# Patient Record
Sex: MALE | Race: WHITE | URBAN - METROPOLITAN AREA
[De-identification: names, ages, dates, MRNs, and addresses within clinical notes are randomized per-mention and may not be internally consistent; named-entity substitution may affect disease eponyms.]

---

## 2018-10-26 ENCOUNTER — OFFICE VISIT (OUTPATIENT)
Dept: FAMILY MEDICINE CLINIC | Facility: CLINIC | Age: 38
End: 2018-10-26
Payer: COMMERCIAL

## 2018-10-26 VITALS
BODY MASS INDEX: 29 KG/M2 | WEIGHT: 196 LBS | DIASTOLIC BLOOD PRESSURE: 78 MMHG | SYSTOLIC BLOOD PRESSURE: 119 MMHG | HEART RATE: 65 BPM | TEMPERATURE: 98 F

## 2018-10-26 DIAGNOSIS — M75.21 BICEPS TENDONITIS ON RIGHT: Primary | ICD-10-CM

## 2018-10-26 PROCEDURE — 99213 OFFICE O/P EST LOW 20 MIN: CPT | Performed by: FAMILY MEDICINE

## 2018-10-26 PROCEDURE — 99212 OFFICE O/P EST SF 10 MIN: CPT | Performed by: FAMILY MEDICINE

## 2018-10-26 RX ORDER — DICLOFENAC SODIUM 75 MG/1
75 TABLET, DELAYED RELEASE ORAL 2 TIMES DAILY
Qty: 60 TABLET | Refills: 0 | Status: SHIPPED | OUTPATIENT
Start: 2018-10-26 | End: 2020-08-03 | Stop reason: ALTCHOICE

## 2018-10-26 NOTE — PROGRESS NOTES
HPI:    Patient ID: Sherine Zamora is a 43year old male. HPI  Patient presents with:  Shoulder Pain: RT shoulder pain, dull deep pain, sore in mornings, for 2 months,   Imm/Inj: for flu shot     Review of Systems   Constitutional: Negative.     Musculo

## 2020-04-20 ENCOUNTER — MED REC SCAN ONLY (OUTPATIENT)
Dept: FAMILY MEDICINE CLINIC | Facility: CLINIC | Age: 40
End: 2020-04-20

## 2020-07-24 ENCOUNTER — MED REC SCAN ONLY (OUTPATIENT)
Dept: FAMILY MEDICINE CLINIC | Facility: CLINIC | Age: 40
End: 2020-07-24

## 2020-08-03 ENCOUNTER — OFFICE VISIT (OUTPATIENT)
Dept: FAMILY MEDICINE CLINIC | Facility: CLINIC | Age: 40
End: 2020-08-03
Payer: COMMERCIAL

## 2020-08-03 VITALS
SYSTOLIC BLOOD PRESSURE: 116 MMHG | TEMPERATURE: 97 F | HEIGHT: 69 IN | HEART RATE: 75 BPM | WEIGHT: 194 LBS | BODY MASS INDEX: 28.73 KG/M2 | DIASTOLIC BLOOD PRESSURE: 74 MMHG

## 2020-08-03 DIAGNOSIS — H69.93 DISORDER OF BOTH EUSTACHIAN TUBES: ICD-10-CM

## 2020-08-03 DIAGNOSIS — J02.9 ACUTE PHARYNGITIS, UNSPECIFIED ETIOLOGY: Primary | ICD-10-CM

## 2020-08-03 PROCEDURE — 99212 OFFICE O/P EST SF 10 MIN: CPT | Performed by: FAMILY MEDICINE

## 2020-08-03 PROCEDURE — 3074F SYST BP LT 130 MM HG: CPT | Performed by: FAMILY MEDICINE

## 2020-08-03 PROCEDURE — 3078F DIAST BP <80 MM HG: CPT | Performed by: FAMILY MEDICINE

## 2020-08-03 PROCEDURE — 3008F BODY MASS INDEX DOCD: CPT | Performed by: FAMILY MEDICINE

## 2020-08-03 PROCEDURE — 99213 OFFICE O/P EST LOW 20 MIN: CPT | Performed by: FAMILY MEDICINE

## 2020-08-03 RX ORDER — CLOBETASOL PROPIONATE 0.5 MG/G
CREAM TOPICAL
COMMUNITY
End: 2020-12-15

## 2020-08-03 RX ORDER — CLINDAMYCIN PHOSPHATE 11.9 MG/ML
SOLUTION TOPICAL
COMMUNITY
Start: 2020-03-12 | End: 2020-12-15 | Stop reason: ALTCHOICE

## 2020-08-03 RX ORDER — DOXYCYCLINE HYCLATE 100 MG/1
CAPSULE ORAL
COMMUNITY
Start: 2020-03-18 | End: 2020-12-15

## 2020-08-03 NOTE — PROGRESS NOTES
HPI:    Patient ID: Felipe Essex is a 38year old male.     HPI  Patient presents with:  Lymph Node: pain on both sides of lymph nodes   Ear Pain: both ears in pain causing slight headaches   Jaw Pain: having jaw pain when touching with tongue on inside

## 2020-11-06 ENCOUNTER — TELEMEDICINE (OUTPATIENT)
Dept: FAMILY MEDICINE CLINIC | Facility: CLINIC | Age: 40
End: 2020-11-06
Payer: COMMERCIAL

## 2020-11-06 DIAGNOSIS — Z20.822 CLOSE EXPOSURE TO COVID-19 VIRUS: Primary | ICD-10-CM

## 2020-11-06 PROCEDURE — 99441 PHONE E/M BY PHYS 5-10 MIN: CPT | Performed by: FAMILY MEDICINE

## 2020-11-06 NOTE — PROGRESS NOTES
Virtual Telephone Check-In    Mairtza Law verbally consents to a Virtual/Telephone Check-In visit on 11/06/20. Patient has been referred to the [facility] website at [email or web address] to review the yearly Consent to Treat document.     Patient underst

## 2020-11-09 ENCOUNTER — APPOINTMENT (OUTPATIENT)
Dept: LAB | Facility: HOSPITAL | Age: 40
End: 2020-11-09
Attending: FAMILY MEDICINE
Payer: COMMERCIAL

## 2020-11-09 DIAGNOSIS — Z20.822 CLOSE EXPOSURE TO COVID-19 VIRUS: ICD-10-CM

## 2020-12-15 ENCOUNTER — OFFICE VISIT (OUTPATIENT)
Dept: FAMILY MEDICINE CLINIC | Facility: CLINIC | Age: 40
End: 2020-12-15
Payer: COMMERCIAL

## 2020-12-15 VITALS
HEIGHT: 69 IN | DIASTOLIC BLOOD PRESSURE: 79 MMHG | WEIGHT: 196 LBS | SYSTOLIC BLOOD PRESSURE: 139 MMHG | HEART RATE: 71 BPM | BODY MASS INDEX: 29.03 KG/M2

## 2020-12-15 DIAGNOSIS — R42 VERTIGO: Primary | ICD-10-CM

## 2020-12-15 PROCEDURE — 3078F DIAST BP <80 MM HG: CPT | Performed by: FAMILY MEDICINE

## 2020-12-15 PROCEDURE — 3075F SYST BP GE 130 - 139MM HG: CPT | Performed by: FAMILY MEDICINE

## 2020-12-15 PROCEDURE — 99212 OFFICE O/P EST SF 10 MIN: CPT | Performed by: FAMILY MEDICINE

## 2020-12-15 PROCEDURE — 99213 OFFICE O/P EST LOW 20 MIN: CPT | Performed by: FAMILY MEDICINE

## 2020-12-15 PROCEDURE — 3008F BODY MASS INDEX DOCD: CPT | Performed by: FAMILY MEDICINE

## 2020-12-15 RX ORDER — MECLIZINE HYDROCHLORIDE 25 MG/1
25 TABLET ORAL 3 TIMES DAILY PRN
Qty: 20 TABLET | Refills: 0 | Status: SHIPPED | OUTPATIENT
Start: 2020-12-15

## 2020-12-16 NOTE — PROGRESS NOTES
HPI:    Patient ID: Becca Solorio is a 38year old male.     HPI  Patient presents with:  Dizziness: experiencing dizziness when laying down or walking, feeling unbalance, slight headache lingering when dizziness occurs, happens more at night,   for  10 d

## 2021-09-21 ENCOUNTER — MED REC SCAN ONLY (OUTPATIENT)
Dept: FAMILY MEDICINE CLINIC | Facility: CLINIC | Age: 41
End: 2021-09-21

## 2022-03-21 ENCOUNTER — OFFICE VISIT (OUTPATIENT)
Dept: FAMILY MEDICINE CLINIC | Facility: CLINIC | Age: 42
End: 2022-03-21
Payer: COMMERCIAL

## 2022-03-21 ENCOUNTER — LAB ENCOUNTER (OUTPATIENT)
Dept: LAB | Age: 42
End: 2022-03-21
Attending: FAMILY MEDICINE
Payer: COMMERCIAL

## 2022-03-21 VITALS
DIASTOLIC BLOOD PRESSURE: 71 MMHG | BODY MASS INDEX: 26.66 KG/M2 | SYSTOLIC BLOOD PRESSURE: 116 MMHG | WEIGHT: 180 LBS | HEART RATE: 65 BPM | HEIGHT: 69 IN

## 2022-03-21 DIAGNOSIS — Z00.00 ROUTINE GENERAL MEDICAL EXAMINATION AT A HEALTH CARE FACILITY: Primary | ICD-10-CM

## 2022-03-21 DIAGNOSIS — Z00.00 ROUTINE GENERAL MEDICAL EXAMINATION AT A HEALTH CARE FACILITY: ICD-10-CM

## 2022-03-21 LAB
ALBUMIN SERPL-MCNC: 4.7 G/DL (ref 3.4–5)
ALBUMIN/GLOB SERPL: 1.9 {RATIO} (ref 1–2)
ALP LIVER SERPL-CCNC: 57 U/L
ALT SERPL-CCNC: 33 U/L
ANION GAP SERPL CALC-SCNC: 5 MMOL/L (ref 0–18)
AST SERPL-CCNC: 22 U/L (ref 15–37)
BASOPHILS # BLD AUTO: 0.06 X10(3) UL (ref 0–0.2)
BASOPHILS NFR BLD AUTO: 0.9 %
BILIRUB SERPL-MCNC: 1.3 MG/DL (ref 0.1–2)
BUN BLD-MCNC: 14 MG/DL (ref 7–18)
BUN/CREAT SERPL: 13.5 (ref 10–20)
CALCIUM BLD-MCNC: 9.3 MG/DL (ref 8.5–10.1)
CHLORIDE SERPL-SCNC: 105 MMOL/L (ref 98–112)
CHOLEST SERPL-MCNC: 220 MG/DL (ref ?–200)
CO2 SERPL-SCNC: 29 MMOL/L (ref 21–32)
COMPLEXED PSA SERPL-MCNC: 1.13 NG/ML (ref ?–4)
CREAT BLD-MCNC: 1.04 MG/DL
DEPRECATED RDW RBC AUTO: 41.7 FL (ref 35.1–46.3)
EOSINOPHIL # BLD AUTO: 0.26 X10(3) UL (ref 0–0.7)
EOSINOPHIL NFR BLD AUTO: 3.7 %
ERYTHROCYTE [DISTWIDTH] IN BLOOD BY AUTOMATED COUNT: 12.1 % (ref 11–15)
FASTING PATIENT LIPID ANSWER: NO
FASTING STATUS PATIENT QL REPORTED: NO
GLOBULIN PLAS-MCNC: 2.5 G/DL (ref 2.8–4.4)
GLUCOSE BLD-MCNC: 86 MG/DL (ref 70–99)
HCT VFR BLD AUTO: 45.8 %
HDLC SERPL-MCNC: 59 MG/DL (ref 40–59)
HGB BLD-MCNC: 15.1 G/DL
IMM GRANULOCYTES # BLD AUTO: 0.01 X10(3) UL (ref 0–1)
IMM GRANULOCYTES NFR BLD: 0.1 %
LDLC SERPL CALC-MCNC: 114 MG/DL (ref ?–100)
LYMPHOCYTES NFR BLD AUTO: 20.9 %
MCH RBC QN AUTO: 30.8 PG (ref 26–34)
MCHC RBC AUTO-ENTMCNC: 33 G/DL (ref 31–37)
MCV RBC AUTO: 93.5 FL
MONOCYTES # BLD AUTO: 0.69 X10(3) UL (ref 0.1–1)
MONOCYTES NFR BLD AUTO: 9.8 %
NEUTROPHILS # BLD AUTO: 4.56 X10 (3) UL (ref 1.5–7.7)
NEUTROPHILS # BLD AUTO: 4.56 X10(3) UL (ref 1.5–7.7)
NEUTROPHILS NFR BLD AUTO: 64.6 %
NONHDLC SERPL-MCNC: 161 MG/DL (ref ?–130)
OSMOLALITY SERPL CALC.SUM OF ELEC: 288 MOSM/KG (ref 275–295)
PLATELET # BLD AUTO: 197 10(3)UL (ref 150–450)
POTASSIUM SERPL-SCNC: 4.1 MMOL/L (ref 3.5–5.1)
PROT SERPL-MCNC: 7.2 G/DL (ref 6.4–8.2)
RBC # BLD AUTO: 4.9 X10(6)UL
SODIUM SERPL-SCNC: 139 MMOL/L (ref 136–145)
TRIGL SERPL-MCNC: 275 MG/DL (ref 30–149)
VLDLC SERPL CALC-MCNC: 48 MG/DL (ref 0–30)
WBC # BLD AUTO: 7.1 X10(3) UL (ref 4–11)

## 2022-03-21 PROCEDURE — 86480 TB TEST CELL IMMUN MEASURE: CPT

## 2022-03-21 PROCEDURE — 3078F DIAST BP <80 MM HG: CPT | Performed by: FAMILY MEDICINE

## 2022-03-21 PROCEDURE — 99396 PREV VISIT EST AGE 40-64: CPT | Performed by: FAMILY MEDICINE

## 2022-03-21 PROCEDURE — 80061 LIPID PANEL: CPT

## 2022-03-21 PROCEDURE — 3074F SYST BP LT 130 MM HG: CPT | Performed by: FAMILY MEDICINE

## 2022-03-21 PROCEDURE — 80053 COMPREHEN METABOLIC PANEL: CPT

## 2022-03-21 PROCEDURE — 36415 COLL VENOUS BLD VENIPUNCTURE: CPT

## 2022-03-21 PROCEDURE — 85025 COMPLETE CBC W/AUTO DIFF WBC: CPT

## 2022-03-21 PROCEDURE — 90715 TDAP VACCINE 7 YRS/> IM: CPT | Performed by: FAMILY MEDICINE

## 2022-03-21 PROCEDURE — 3008F BODY MASS INDEX DOCD: CPT | Performed by: FAMILY MEDICINE

## 2022-03-21 PROCEDURE — 90471 IMMUNIZATION ADMIN: CPT | Performed by: FAMILY MEDICINE

## 2022-03-23 LAB
M TB IFN-G CD4+ T-CELLS BLD-ACNC: 0 IU/ML
M TB TUBERC IFN-G BLD QL: NEGATIVE
M TB TUBERC IGNF/MITOGEN IGNF CONTROL: 4.99 IU/ML
QFT TB1 AG MINUS NIL: 0.02 IU/ML
QFT TB2 AG MINUS NIL: 0.01 IU/ML

## 2022-04-27 ENCOUNTER — MED REC SCAN ONLY (OUTPATIENT)
Dept: FAMILY MEDICINE CLINIC | Facility: CLINIC | Age: 42
End: 2022-04-27

## 2024-03-11 ENCOUNTER — MED REC SCAN ONLY (OUTPATIENT)
Dept: FAMILY MEDICINE CLINIC | Facility: CLINIC | Age: 44
End: 2024-03-11

## 2024-06-19 ENCOUNTER — MED REC SCAN ONLY (OUTPATIENT)
Dept: FAMILY MEDICINE CLINIC | Facility: CLINIC | Age: 44
End: 2024-06-19

## 2024-12-09 ENCOUNTER — OFFICE VISIT (OUTPATIENT)
Dept: FAMILY MEDICINE CLINIC | Facility: CLINIC | Age: 44
End: 2024-12-09
Payer: COMMERCIAL

## 2024-12-09 VITALS
HEART RATE: 76 BPM | BODY MASS INDEX: 29.77 KG/M2 | SYSTOLIC BLOOD PRESSURE: 113 MMHG | HEIGHT: 69 IN | DIASTOLIC BLOOD PRESSURE: 71 MMHG | WEIGHT: 201 LBS

## 2024-12-09 DIAGNOSIS — J01.80 ACUTE NON-RECURRENT SINUSITIS OF OTHER SINUS: Primary | ICD-10-CM

## 2024-12-09 DIAGNOSIS — H69.93 DYSFUNCTION OF BOTH EUSTACHIAN TUBES: ICD-10-CM

## 2024-12-09 PROCEDURE — 99213 OFFICE O/P EST LOW 20 MIN: CPT | Performed by: FAMILY MEDICINE

## 2024-12-09 RX ORDER — FLUTICASONE PROPIONATE 50 MCG
2 SPRAY, SUSPENSION (ML) NASAL DAILY
Qty: 1 EACH | Refills: 0 | Status: SHIPPED | OUTPATIENT
Start: 2024-12-09

## 2024-12-09 NOTE — PATIENT INSTRUCTIONS
Recommend:  Symptomatic treatment    Medication(s), Augmentin, oral antibiotic and Flonase, steroid nasal spray, as prescribed    Follow-up with your PCP in the next few days, or sooner as needed      Go to the emergency room or call 911 for any new or suddenly worsening symptoms, any signs of acute distress, respiratory stress or emergent changes.      Care for upper respiratory infection (nasal and sinus congestion, cough, and sore throat):  Encourage rest, keeping well hydrated, and symptomatic treatment.  You may try warm water or tea with honey and lemon.  For a sore throat salt water gargles 3 times a day may be helpful.  For congestion and cold symptoms, a humidifier may also be helpful.  For nasal and sinus congestion try saline nasal rinses (like [place] Hammer Simply Saline) 3-4 times a day.  Acetaminophen (Tylenol) and/or NSAID like Ibuprofen (take with food, stop if side effects or heartburn occurs) as needed and as tolerated. Take at least 3 hours apart if needed. (Do not take if contraindicated or you have been told otherwise.)

## 2024-12-09 NOTE — PROGRESS NOTES
HPI:   Femi Jett is a 44 year old male who presents for:    Patient presents with:  Bilateral ear pain, pressure, popping, congestion, frontal headache and sinus pressure for 1 week, patient states that about 3 weeks ago he had cold symptoms which have resolved, no cough, no fever, no chills now, he does have some clear nasal discharge now, he states he gets something similar every year but now more frequent that his 3-year-old daughter is in             See ROS for pertinent positive and negative complaints.    Allergies[1]    Current Outpatient Medications   Medication Sig Dispense Refill    MINOXIDIL HAIR REGROWTH/MEN EX Apply topically.      amoxicillin clavulanate 875-125 MG Oral Tab Take 1 tablet by mouth 2 (two) times daily for 7 days. 7 14 tablet 0    fluticasone propionate 50 MCG/ACT Nasal Suspension 2 sprays by Each Nare route daily. For 1-2 weeks then prn 1 each 0    Meclizine HCl 25 MG Oral Tab Take 1 tablet (25 mg total) by mouth 3 (three) times daily as needed. (Patient not taking: Reported on 12/9/2024) 20 tablet 0    hydrocortisone 2.5 % External Ointment APPLY BID PRN TO RASH NEAR EARS (Patient not taking: Reported on 12/9/2024)       Past Medical History:    Eczema    Fracture of left forearm    [id number]  -- removal hardware 1996     Past Surgical History:   Procedure Laterality Date    Hernia surgery  1980    inguinal hernia repair       REVIEW OF SYSTEMS:   Review of Systems   Constitutional:  Negative for activity change, appetite change, fatigue and fever.   HENT:  Positive for congestion, ear pain, sinus pressure, sinus pain and sore throat (Feels more from breathing from his mouth at night due to the nasal congestion). Negative for ear discharge, facial swelling and rhinorrhea.    Eyes:  Negative for discharge and redness.   Respiratory:  Negative for cough, shortness of breath and wheezing.    Cardiovascular: Negative.    Gastrointestinal: Negative.    Genitourinary: Negative.     Musculoskeletal:  Negative for myalgias.   Skin:  Negative for rash.   Neurological:  Positive for headaches. Negative for dizziness and weakness.   Psychiatric/Behavioral: Negative.         PHYSICAL EXAM:   /71 (BP Location: Left arm, Patient Position: Sitting, Cuff Size: adult)   Pulse 76   Ht 5' 9\" (1.753 m)   Wt 201 lb (91.2 kg)   BMI 29.68 kg/m²  Estimated body mass index is 29.68 kg/m² as calculated from the following:    Height as of this encounter: 5' 9\" (1.753 m).    Weight as of this encounter: 201 lb (91.2 kg).  Physical Exam  Vitals and nursing note reviewed.   Constitutional:       General: He is not in acute distress.     Appearance: Normal appearance. He is normal weight. He is not ill-appearing, toxic-appearing or diaphoretic.   HENT:      Head: Normocephalic.      Comments: No facial tenderness, edema or erythema bilaterally     Right Ear: Ear canal and external ear normal. There is no impacted cerumen.      Left Ear: Ear canal and external ear normal. There is no impacted cerumen.      Ears:      Comments: Bilateral tympanic membrane with mild bulge, dull, yellow/gina coloration, right more than left, no discharge or erythema     Nose: Congestion present. No rhinorrhea.      Mouth/Throat:      Mouth: Mucous membranes are moist.      Pharynx: No oropharyngeal exudate or posterior oropharyngeal erythema.   Eyes:      General:         Right eye: No discharge.         Left eye: No discharge.      Extraocular Movements: Extraocular movements intact.      Conjunctiva/sclera: Conjunctivae normal.   Pulmonary:      Effort: Pulmonary effort is normal. No respiratory distress.      Breath sounds: Normal breath sounds. No stridor. No wheezing, rhonchi or rales.   Musculoskeletal:         General: Normal range of motion.      Cervical back: Normal range of motion and neck supple. No rigidity or tenderness.   Lymphadenopathy:      Cervical: No cervical adenopathy.   Skin:     Capillary Refill:  Capillary refill takes less than 2 seconds.      Findings: No rash.   Neurological:      General: No focal deficit present.      Mental Status: He is alert and oriented to person, place, and time.   Psychiatric:         Mood and Affect: Mood normal.         Behavior: Behavior normal.         ASSESSMENT AND PLAN:   1. Patient is a 44 year old male who presents for:     1. Acute non-recurrent sinusitis of other sinus likely versus other cause, No acute or respiratory distress  See recommendations and follow-up below  - amoxicillin clavulanate 875-125 MG Oral Tab; Take 1 tablet by mouth 2 (two) times daily for 7 days. 7  Dispense: 14 tablet; Refill: 0  - fluticasone propionate 50 MCG/ACT Nasal Suspension; 2 sprays by Each Nare route daily. For 1-2 weeks then prn  Dispense: 1 each; Refill: 0    2. Dysfunction of both eustachian tubes  Likely vs?,  May be contributing to symptoms/illness  See recommendations and follow-up below  - fluticasone propionate 50 MCG/ACT Nasal Suspension; 2 sprays by Each Nare route daily. For 1-2 weeks then prn  Dispense: 1 each; Refill: 0    Patient (and/or guardian or parent if less than 18 years old) was given instructions regarding diagnosis, management, expectations and follow up.    Patient Instructions   Recommend:  Symptomatic treatment    Medication(s), Augmentin, oral antibiotic and Flonase, steroid nasal spray, as prescribed    Follow-up with your PCP in the next few days, or sooner as needed      Go to the emergency room or call 911 for any new or suddenly worsening symptoms, any signs of acute distress, respiratory stress or emergent changes.      Care for upper respiratory infection (nasal and sinus congestion, cough, and sore throat):  Encourage rest, keeping well hydrated, and symptomatic treatment.  You may try warm water or tea with honey and lemon.  For a sore throat salt water gargles 3 times a day may be helpful.  For congestion and cold symptoms, a humidifier may also be  helpful.  For nasal and sinus congestion try saline nasal rinses (like [place] Hammer Simply Saline) 3-4 times a day.  Acetaminophen (Tylenol) and/or NSAID like Ibuprofen (take with food, stop if side effects or heartburn occurs) as needed and as tolerated. Take at least 3 hours apart if needed. (Do not take if contraindicated or you have been told otherwise.)            (See AVS)    All questions were answered.  We discussed the indications, proper use, risks, and benefits of the above recommendations including any medication(s) as prescribed.  The patient's parent (or guardian) indicate(s) understanding and agree(s) to the above plan of care.    No orders of the defined types were placed in this encounter.    Meds & Refills for this Visit:  Requested Prescriptions     Signed Prescriptions Disp Refills    amoxicillin clavulanate 875-125 MG Oral Tab 14 tablet 0     Sig: Take 1 tablet by mouth 2 (two) times daily for 7 days. 7    fluticasone propionate 50 MCG/ACT Nasal Suspension 1 each 0     Si sprays by Each Nare route daily. For 1-2 weeks then prn     Other Orders, Imaging & Consults:  None    Jazmyn Roger MD          [1] No Known Allergies

## 2025-02-01 ENCOUNTER — OFFICE VISIT (OUTPATIENT)
Dept: FAMILY MEDICINE CLINIC | Facility: CLINIC | Age: 45
End: 2025-02-01

## 2025-02-01 VITALS
HEIGHT: 69 IN | HEART RATE: 96 BPM | SYSTOLIC BLOOD PRESSURE: 123 MMHG | DIASTOLIC BLOOD PRESSURE: 74 MMHG | RESPIRATION RATE: 16 BRPM | WEIGHT: 196 LBS | OXYGEN SATURATION: 98 % | TEMPERATURE: 97 F | BODY MASS INDEX: 29.03 KG/M2

## 2025-02-01 DIAGNOSIS — Z12.11 ENCOUNTER FOR SCREENING COLONOSCOPY: Primary | ICD-10-CM

## 2025-02-01 NOTE — PROGRESS NOTES
Blood pressure 123/74, pulse 96, temperature 97.1 °F (36.2 °C), temperature source Tympanic, resp. rate 16, height 5' 9\" (1.753 m), weight 196 lb (88.9 kg), SpO2 98%.          11-day history of coughing up clear to yellow phlegm also with sore throat.  Yellow nasal congestion some fatigue.  Also with ear pain no fever no chills.  No difficulty breathing.  Decreased appetite with a frontal headache.  No facial pressure no bad breath no tooth pain no sneezing, watery or itchy eyes.  No smoking or asthma history.  Some relief with Tylenol and NyQuil.    Objective comfortable no apparent distress    Throat cobblestoning no exudate    Lungs clear to auscultation no rales rhonchi or wheezes    Assessment sinusitis    Plan Augmentin prescription risks and benefits explained follow-up if no improvement in 3 days